# Patient Record
Sex: MALE | Race: BLACK OR AFRICAN AMERICAN | Employment: FULL TIME | URBAN - METROPOLITAN AREA
[De-identification: names, ages, dates, MRNs, and addresses within clinical notes are randomized per-mention and may not be internally consistent; named-entity substitution may affect disease eponyms.]

---

## 2018-05-02 ENCOUNTER — NON-PROVIDER VISIT (OUTPATIENT)
Dept: OCCUPATIONAL MEDICINE | Facility: CLINIC | Age: 38
End: 2018-05-02

## 2018-05-02 DIAGNOSIS — Z02.1 PRE-EMPLOYMENT DRUG SCREENING: ICD-10-CM

## 2018-05-02 PROCEDURE — 80305 DRUG TEST PRSMV DIR OPT OBS: CPT | Performed by: INTERNAL MEDICINE

## 2018-05-03 LAB
AMP AMPHETAMINE: NORMAL
COC COCAINE: NORMAL
INT CON NEG: NORMAL
INT CON POS: NORMAL
MET METHAMPHETAMINES: NORMAL
OPI OPIATES: NORMAL
PCP PHENCYCLIDINE: NORMAL
POC DRUG COMMENT 753798-OCCUPATIONAL HEALTH: NEGATIVE
THC: NORMAL

## 2019-05-02 ENCOUNTER — OFFICE VISIT (OUTPATIENT)
Dept: URGENT CARE | Facility: MEDICAL CENTER | Age: 39
End: 2019-05-02

## 2019-05-02 VITALS
OXYGEN SATURATION: 100 % | TEMPERATURE: 98.6 F | HEART RATE: 91 BPM | RESPIRATION RATE: 16 BRPM | WEIGHT: 144.2 LBS | DIASTOLIC BLOOD PRESSURE: 84 MMHG | SYSTOLIC BLOOD PRESSURE: 122 MMHG

## 2019-05-02 DIAGNOSIS — M54.2 NECK PAIN ON LEFT SIDE: ICD-10-CM

## 2019-05-02 DIAGNOSIS — H52.13 MYOPIA OF BOTH EYES: ICD-10-CM

## 2019-05-02 DIAGNOSIS — M62.838 CERVICAL PARASPINAL MUSCLE SPASM: ICD-10-CM

## 2019-05-02 PROCEDURE — 99204 OFFICE O/P NEW MOD 45 MIN: CPT | Performed by: NURSE PRACTITIONER

## 2019-05-02 RX ORDER — CYCLOBENZAPRINE HCL 10 MG
10 TABLET ORAL 3 TIMES DAILY PRN
Qty: 20 TAB | Refills: 0 | Status: SHIPPED | OUTPATIENT
Start: 2019-05-02 | End: 2021-05-10

## 2019-05-06 ASSESSMENT — ENCOUNTER SYMPTOMS
BLURRED VISION: 1
NECK PAIN: 1

## 2019-05-06 NOTE — PROGRESS NOTES
Subjective:      Navius Saint-Paul is a 39 y.o. female who presents with Other (eye check, trouble seeing up close)            This is a new problem. A Fijian interpretor was used for this visit. Patient presents to the urgent care complaining of not being able to see or read anything far away he states this issue has been going on for several years.  He admits he has not been to an eye doctor for area he also notes pain to the left side of his neck that he has had for about a month.  He states some days the pain is worse than others, he reports it is a stiff feeling in the left side of his neck.  He states when he wakes up in the morning this is been the pain seems to be the most pronounced.  He states the pain from his neck will sometimes radiate to the front part of his neck and slightly to his left shoulder.  He denies any recollection of an injury to his neck he has taken some Aleve without much pain relief.        Review of Systems   Eyes: Positive for blurred vision.   Musculoskeletal: Positive for neck pain.   All other systems reviewed and are negative.    No past medical history on file. No past surgical history on file.   Social History     Social History   • Marital status: Unknown     Spouse name: N/A   • Number of children: N/A   • Years of education: N/A     Occupational History   • Not on file.     Social History Main Topics   • Smoking status: Not on file   • Smokeless tobacco: Not on file   • Alcohol use Not on file   • Drug use: Unknown   • Sexual activity: Not on file     Other Topics Concern   • Not on file     Social History Narrative   • No narrative on file          Objective:     /84   Pulse 91   Temp 37 °C (98.6 °F)   Resp 16   Wt 65.4 kg (144 lb 3.2 oz)   SpO2 100%      Physical Exam   Constitutional: She is oriented to person, place, and time. Vital signs are normal. She appears well-developed and well-nourished.   HENT:   Head: Normocephalic and atraumatic.   Eyes: Pupils are  equal, round, and reactive to light. EOM are normal.   Neck: Normal range of motion. Neck supple. Muscular tenderness present. No spinous process tenderness present. No neck rigidity. No edema, no erythema and normal range of motion present.       Cardiovascular: Normal rate and regular rhythm.    Pulmonary/Chest: Effort normal.   Musculoskeletal: Normal range of motion.   Lymphadenopathy:     She has no cervical adenopathy.   Neurological: She is alert and oriented to person, place, and time.   Skin: Skin is warm and dry. Capillary refill takes less than 2 seconds.   Psychiatric: She has a normal mood and affect. Her speech is normal and behavior is normal. Thought content normal.   Vitals reviewed.              Assessment/Plan:     1. Myopia of both eyes    2. Neck pain on left side    3. Cervical paraspinal muscle spasm  - cyclobenzaprine (FLEXERIL) 10 MG Tab; Take 1 Tab by mouth 3 times a day as needed for Muscle Spasms.  Dispense: 20 Tab; Refill: 0    Discussed with patient he needs to have a proper eye exam and will likely require a prescription  Referred to [facility] as he does not have insurance  Etiology of neck pain is not clear, discussed with patient we will trial muscle relaxers and look for improvement. He agrees  Alternate tylenol and ibuprofen as needed for pain  Red flags discussed for neck pain  Encouraged pt to establish with PCP  Supportive care, differential diagnoses, and indications for immediate follow-up discussed with patient.    Pathogenesis of diagnosis discussed including typical length and natural progression.      Instructed to return to  or nearest emergency department if symptoms fail to improve, for any change in condition, further concerns, or new concerning symptoms.  Patient states understanding of the plan of care and discharge instructions.

## 2019-10-14 ENCOUNTER — APPOINTMENT (OUTPATIENT)
Dept: RADIOLOGY | Facility: MEDICAL CENTER | Age: 39
End: 2019-10-14
Attending: EMERGENCY MEDICINE
Payer: COMMERCIAL

## 2019-10-14 ENCOUNTER — HOSPITAL ENCOUNTER (EMERGENCY)
Facility: MEDICAL CENTER | Age: 39
End: 2019-10-14
Attending: EMERGENCY MEDICINE
Payer: COMMERCIAL

## 2019-10-14 VITALS
RESPIRATION RATE: 16 BRPM | HEART RATE: 75 BPM | BODY MASS INDEX: 19.98 KG/M2 | OXYGEN SATURATION: 100 % | SYSTOLIC BLOOD PRESSURE: 111 MMHG | HEIGHT: 70 IN | TEMPERATURE: 97.2 F | DIASTOLIC BLOOD PRESSURE: 68 MMHG | WEIGHT: 139.55 LBS

## 2019-10-14 DIAGNOSIS — R07.2 PRECORDIAL CHEST PAIN: ICD-10-CM

## 2019-10-14 DIAGNOSIS — R07.89 CHEST WALL PAIN: ICD-10-CM

## 2019-10-14 LAB
ALBUMIN SERPL BCP-MCNC: 4.5 G/DL (ref 3.2–4.9)
ALBUMIN/GLOB SERPL: 1.5 G/DL
ALP SERPL-CCNC: 101 U/L (ref 30–99)
ALT SERPL-CCNC: 18 U/L (ref 2–50)
ANION GAP SERPL CALC-SCNC: 6 MMOL/L (ref 0–11.9)
AST SERPL-CCNC: 20 U/L (ref 12–45)
BASOPHILS # BLD AUTO: 0.5 % (ref 0–1.8)
BASOPHILS # BLD: 0.03 K/UL (ref 0–0.12)
BILIRUB SERPL-MCNC: 0.3 MG/DL (ref 0.1–1.5)
BUN SERPL-MCNC: 17 MG/DL (ref 8–22)
CALCIUM SERPL-MCNC: 9.5 MG/DL (ref 8.5–10.5)
CHLORIDE SERPL-SCNC: 104 MMOL/L (ref 96–112)
CO2 SERPL-SCNC: 27 MMOL/L (ref 20–33)
CREAT SERPL-MCNC: 1.05 MG/DL (ref 0.5–1.4)
EKG IMPRESSION: NORMAL
EOSINOPHIL # BLD AUTO: 0.17 K/UL (ref 0–0.51)
EOSINOPHIL NFR BLD: 3.1 % (ref 0–6.9)
ERYTHROCYTE [DISTWIDTH] IN BLOOD BY AUTOMATED COUNT: 43.9 FL (ref 35.9–50)
GLOBULIN SER CALC-MCNC: 3 G/DL (ref 1.9–3.5)
GLUCOSE SERPL-MCNC: 78 MG/DL (ref 65–99)
HCT VFR BLD AUTO: 49 % (ref 42–52)
HGB BLD-MCNC: 16.1 G/DL (ref 14–18)
IMM GRANULOCYTES # BLD AUTO: 0 K/UL (ref 0–0.11)
IMM GRANULOCYTES NFR BLD AUTO: 0 % (ref 0–0.9)
LYMPHOCYTES # BLD AUTO: 2.59 K/UL (ref 1–4.8)
LYMPHOCYTES NFR BLD: 46.6 % (ref 22–41)
MCH RBC QN AUTO: 29 PG (ref 27–33)
MCHC RBC AUTO-ENTMCNC: 32.9 G/DL (ref 33.7–35.3)
MCV RBC AUTO: 88.3 FL (ref 81.4–97.8)
MONOCYTES # BLD AUTO: 0.54 K/UL (ref 0–0.85)
MONOCYTES NFR BLD AUTO: 9.7 % (ref 0–13.4)
NEUTROPHILS # BLD AUTO: 2.23 K/UL (ref 1.82–7.42)
NEUTROPHILS NFR BLD: 40.1 % (ref 44–72)
NRBC # BLD AUTO: 0 K/UL
NRBC BLD-RTO: 0 /100 WBC
PLATELET # BLD AUTO: 276 K/UL (ref 164–446)
PMV BLD AUTO: 9.6 FL (ref 9–12.9)
POTASSIUM SERPL-SCNC: 4 MMOL/L (ref 3.6–5.5)
PROT SERPL-MCNC: 7.5 G/DL (ref 6–8.2)
RBC # BLD AUTO: 5.55 M/UL (ref 4.7–6.1)
SODIUM SERPL-SCNC: 137 MMOL/L (ref 135–145)
TROPONIN T SERPL-MCNC: <6 NG/L (ref 6–19)
WBC # BLD AUTO: 5.6 K/UL (ref 4.8–10.8)

## 2019-10-14 PROCEDURE — 96374 THER/PROPH/DIAG INJ IV PUSH: CPT

## 2019-10-14 PROCEDURE — 93005 ELECTROCARDIOGRAM TRACING: CPT | Performed by: EMERGENCY MEDICINE

## 2019-10-14 PROCEDURE — 85025 COMPLETE CBC W/AUTO DIFF WBC: CPT

## 2019-10-14 PROCEDURE — 71045 X-RAY EXAM CHEST 1 VIEW: CPT

## 2019-10-14 PROCEDURE — 84484 ASSAY OF TROPONIN QUANT: CPT

## 2019-10-14 PROCEDURE — 700111 HCHG RX REV CODE 636 W/ 250 OVERRIDE (IP): Performed by: EMERGENCY MEDICINE

## 2019-10-14 PROCEDURE — 99285 EMERGENCY DEPT VISIT HI MDM: CPT

## 2019-10-14 PROCEDURE — 80053 COMPREHEN METABOLIC PANEL: CPT

## 2019-10-14 PROCEDURE — 93005 ELECTROCARDIOGRAM TRACING: CPT

## 2019-10-14 PROCEDURE — 36415 COLL VENOUS BLD VENIPUNCTURE: CPT

## 2019-10-14 RX ORDER — KETOROLAC TROMETHAMINE 30 MG/ML
30 INJECTION, SOLUTION INTRAMUSCULAR; INTRAVENOUS ONCE
Status: COMPLETED | OUTPATIENT
Start: 2019-10-14 | End: 2019-10-14

## 2019-10-14 RX ORDER — NAPROXEN 500 MG/1
500 TABLET ORAL 2 TIMES DAILY WITH MEALS
Qty: 20 TAB | Refills: 0 | Status: SHIPPED | OUTPATIENT
Start: 2019-10-14 | End: 2021-05-10

## 2019-10-14 RX ADMIN — KETOROLAC TROMETHAMINE 30 MG: 30 INJECTION, SOLUTION INTRAMUSCULAR at 15:01

## 2019-10-14 SDOH — HEALTH STABILITY: MENTAL HEALTH: HOW OFTEN DO YOU HAVE A DRINK CONTAINING ALCOHOL?: NEVER

## 2019-10-14 NOTE — ED TRIAGE NOTES
"Chief Complaint   Patient presents with   • Chest Pain     substernal chest pain x 3 weeks     ./74   Pulse 83   Temp 36.2 °C (97.2 °F) (Temporal)   Resp 15   Ht 1.778 m (5' 10\")   Wt 63.3 kg (139 lb 8.8 oz)   SpO2 100%   BMI 20.02 kg/m²     Ambulatory to triage with above complaints, states pain increases when he is moving, driving, turning the steering wheel, lifting up his arms, (+) nausea at times, denies current nausea, denies trauma, EKG done, patient educated on triage process, placed in lobby, told to inform staff of any changes in condition.    "

## 2019-10-14 NOTE — ED PROVIDER NOTES
ED Provider  Scribed for Jhoan Lieberman D.O. by Rolf Berry. 10/14/2019  2:23 PM    Means of arrival:walk-in  History obtained from:patient  History limited by: none    CHIEF COMPLAINT  Chief Complaint   Patient presents with   • Chest Pain     substernal chest pain x 3 weeks       HPI  Navius Saint-Paul is a 39 y.o. male who presents to the ED complaining of constant superior sternal chest pain onset 3 weeks ago. The patient reports that his pain is at its worst when he sneezes and is also exacerbated by getting up from bed, while driving, moving his arms, and excessive movement. He has also taken Aleve for symptom relief but denies any alleviation, which prompted his visit to the ED. He endorses associated nausea in the mornings when he works but denies any emesis, shortness or breath, or dyspnea on exertion. The patient denies having any recent illnesses or regular coughs and also denies any history of heart disease, lung disease, smoking history, or previous medical conditions including hypertension, diabetes, or hyperlipidemia. Additionally, he denies any family history of MI occurring under the age of 55.     REVIEW OF SYSTEMS  See HPI for further details. All other systems are negative.     PAST MEDICAL HISTORY  No history of hypertension, diabetes, or hyperlipidemia.    SOCIAL HISTORY  Social History     Tobacco Use   • Smoking status: Never Smoker   • Smokeless tobacco: Never Used   Substance and Sexual Activity   • Alcohol use: Never     Frequency: Never   • Drug use: Never   Patient speaks Romanian-Creole    SURGICAL HISTORY  patient denies any surgical history    CURRENT MEDICATIONS  Home Medications     Reviewed by Thao Liu R.N. (Registered Nurse) on 10/14/19 at 1200  Med List Status: Partial   Medication Last Dose Status   cyclobenzaprine (FLEXERIL) 10 MG Tab  Active   Naproxen Sodium (ALEVE PO)  Active                ALLERGIES  No Known Allergies    PHYSICAL EXAM  VITAL SIGNS: BP  "117/74   Pulse 83   Temp 36.2 °C (97.2 °F) (Temporal)   Resp 15   Ht 1.778 m (5' 10\")   Wt 63.3 kg (139 lb 8.8 oz)   SpO2 100%   BMI 20.02 kg/m²   Constitutional: Alert in no apparent distress.  HENT: No signs of trauma, mucous membranes are moist  Eyes: Conjunctiva normal, Non-icteric.   Neck: Normal range of motion, No tenderness, Supple.  Lymphatic: No lymphadenopathy noted.   Cardiovascular: Regular rate and rhythm, no murmurs.   Thorax & Lungs: Chest tenderness at the superior sternal area. Normal breath sounds, No respiratory distress, No wheezing.   Abdomen: Bowel sounds normal, Soft, No tenderness, No masses, No pulsatile masses. No peritoneal signs.  Skin: Warm, Dry, normal color.   Back: No bony tenderness, No CVA tenderness.   Extremities: No edema, No tenderness, No cyanosis  Musculoskeletal: Good range of motion in all major joints. No tenderness to palpation or major deformities noted.   Neurologic: Alert and oriented x4, Normal motor function, Normal sensory function, No focal deficits noted.   Psychiatric: Affect normal, Judgment normal, Mood normal.     MEDICAL DECISION MAKING  This is a 39 y.o. male who presents with a chest pain that is worse with the use of the musculoskeletal system of the chest.  He has no other associated symptoms to be concerning for heart disease he has had the pain essentially for 3 weeks.  His EKG is normal with no ischemic signs.  He has no cardiac risk factors and troponin is negative.  His pain is resolved with Toradol I believe this to be musculoskeletal chest pain.  He is discharged home with anti-inflammatory pain medication.    DIAGNOSTIC STUDIES / PROCEDURES    EKG  12 Lead EKG interpreted by me shown below.    LABS  Results for orders placed or performed during the hospital encounter of 10/14/19   CBC with Differential   Result Value Ref Range    WBC 5.6 4.8 - 10.8 K/uL    RBC 5.55 4.70 - 6.10 M/uL    Hemoglobin 16.1 14.0 - 18.0 g/dL    Hematocrit 49.0 42.0 " - 52.0 %    MCV 88.3 81.4 - 97.8 fL    MCH 29.0 27.0 - 33.0 pg    MCHC 32.9 (L) 33.7 - 35.3 g/dL    RDW 43.9 35.9 - 50.0 fL    Platelet Count 276 164 - 446 K/uL    MPV 9.6 9.0 - 12.9 fL    Neutrophils-Polys 40.10 (L) 44.00 - 72.00 %    Lymphocytes 46.60 (H) 22.00 - 41.00 %    Monocytes 9.70 0.00 - 13.40 %    Eosinophils 3.10 0.00 - 6.90 %    Basophils 0.50 0.00 - 1.80 %    Immature Granulocytes 0.00 0.00 - 0.90 %    Nucleated RBC 0.00 /100 WBC    Neutrophils (Absolute) 2.23 1.82 - 7.42 K/uL    Lymphs (Absolute) 2.59 1.00 - 4.80 K/uL    Monos (Absolute) 0.54 0.00 - 0.85 K/uL    Eos (Absolute) 0.17 0.00 - 0.51 K/uL    Baso (Absolute) 0.03 0.00 - 0.12 K/uL    Immature Granulocytes (abs) 0.00 0.00 - 0.11 K/uL    NRBC (Absolute) 0.00 K/uL   Complete Metabolic Panel (CMP)   Result Value Ref Range    Sodium 137 135 - 145 mmol/L    Potassium 4.0 3.6 - 5.5 mmol/L    Chloride 104 96 - 112 mmol/L    Co2 27 20 - 33 mmol/L    Anion Gap 6.0 0.0 - 11.9    Glucose 78 65 - 99 mg/dL    Bun 17 8 - 22 mg/dL    Creatinine 1.05 0.50 - 1.40 mg/dL    Calcium 9.5 8.5 - 10.5 mg/dL    AST(SGOT) 20 12 - 45 U/L    ALT(SGPT) 18 2 - 50 U/L    Alkaline Phosphatase 101 (H) 30 - 99 U/L    Total Bilirubin 0.3 0.1 - 1.5 mg/dL    Albumin 4.5 3.2 - 4.9 g/dL    Total Protein 7.5 6.0 - 8.2 g/dL    Globulin 3.0 1.9 - 3.5 g/dL    A-G Ratio 1.5 g/dL   Troponin   Result Value Ref Range    Troponin T <6 6 - 19 ng/L   ESTIMATED GFR   Result Value Ref Range    GFR If African American >60 >60 mL/min/1.73 m 2    GFR If Non African American >60 >60 mL/min/1.73 m 2   EKG (NOW)   Result Value Ref Range    Report       [facility] Emergency Dept.    Test Date:  2019-10-14  Pt Name:    NAVIUS SAINT-PAUL            Department: ER  MRN:        [id number]                      Room:  Gender:     Male                         Technician: [id number]  :        1980                   Requested By:ER TRIAGE PROTOCOL  Order #:    [id number]                     Reading MD: CATRINA HICKS D.O.    Measurements  Intervals                                Axis  Rate:       79                           P:          70  WI:         148                          QRS:        66  QRSD:       80                           T:          63  QT:         344  QTc:        395    Interpretive Statements  SINUS RHYTHM  No previous ECG available for comparison    Electronically Signed On 10- 16:29:08 PDT by CATRINA HICKS D.O.       All labs reviewed by me.    RADIOLOGY  DX-CHEST-PORTABLE (1 VIEW)   Final Result      Negative single view of the chest.        The radiologist's interpretations of all radiological studies have been reviewed by me.        COURSE  Pertinent Labs & Imaging studies reviewed. (See chart for details)    2:23 PM - Patient seen and examined at bedside. Discussed plan of care. The patient will be medicated with Toradol 30 mg. Ordered for DX-Chest, CBC Diff, CMP, Troponin, and EKG to evaluate his symptoms.     4:12 PM - Patient was reevaluated at bedside and reports that his chest pain has resolved with Toradol. Using an  service, I discussed lab and radiology results with the patient and informed them that he may be safely discharged home at this time. The patient is agreeable and understanding to the plan for discharged and will return for any new or worsening symptoms.    The patient will return for new or worsening symptoms and is stable at the time of discharge.    The patient is referred to a primary physician for blood pressure management, diabetic screening, and for all other preventative health concerns.    DISPOSITION:  Patient will be discharged home in stable condition.    FOLLOW UP:  [facility] scheduling  Please call [phone] to make an appoint with a next available practitioner.          OUTPATIENT MEDICATIONS:  New Prescriptions    NAPROXEN (NAPROSYN) 500 MG TAB    Take 1 Tab by mouth 2 times a day, with meals.       FINAL  IMPRESSION  1. Precordial chest pain    2. Chest wall pain         Rolf FRANKLIN (Scribe), am scribing for, and in the presence of, Jhoan Lieberman D.O..    Electronically signed by: Rolf Berry (Scribe), 10/14/2019    IJhoan D.O. personally performed the services described in this documentation, as scribed by Rolf Berry in my presence, and it is both accurate and complete.    C     The note accurately reflects work and decisions made by me.  Jhoan Lieberman  10/14/2019  4:39 PM

## 2019-10-14 NOTE — DISCHARGE INSTRUCTIONS
Use pain medication as prescribed.  Your pain does not appear to be coming from the heart or lungs and sounds more like is related to the muscles of the chest itself.  Please follow-up with the primary care physician

## 2019-10-15 NOTE — ED NOTES
Pt discharge to home.  Pt provided with discharge instructions and prescriptions.  Pt verbalized understanding, all questions answered.  VSS upon DC. Pt steady on feet upon DC.

## 2021-05-10 ENCOUNTER — OFFICE VISIT (OUTPATIENT)
Dept: URGENT CARE | Facility: CLINIC | Age: 41
End: 2021-05-10
Payer: COMMERCIAL

## 2021-05-10 VITALS
SYSTOLIC BLOOD PRESSURE: 112 MMHG | OXYGEN SATURATION: 99 % | WEIGHT: 151 LBS | HEIGHT: 69 IN | HEART RATE: 82 BPM | DIASTOLIC BLOOD PRESSURE: 60 MMHG | BODY MASS INDEX: 22.36 KG/M2 | TEMPERATURE: 99.3 F | RESPIRATION RATE: 20 BRPM

## 2021-05-10 DIAGNOSIS — M79.18 PIRIFORMIS MUSCLE PAIN: ICD-10-CM

## 2021-05-10 PROCEDURE — 99213 OFFICE O/P EST LOW 20 MIN: CPT | Performed by: PHYSICIAN ASSISTANT

## 2021-05-10 ASSESSMENT — ENCOUNTER SYMPTOMS
SHORTNESS OF BREATH: 0
CONSTIPATION: 0
CHILLS: 0
EYE PAIN: 0
ABDOMINAL PAIN: 0
VOMITING: 0
NAUSEA: 0
DIARRHEA: 0
SORE THROAT: 0
COUGH: 0
MYALGIAS: 0
FEVER: 0
HEADACHES: 0

## 2021-05-10 ASSESSMENT — PAIN SCALES - GENERAL: PAINLEVEL: 6=MODERATE PAIN

## 2021-05-10 ASSESSMENT — FIBROSIS 4 INDEX: FIB4 SCORE: 0.73

## 2021-05-10 NOTE — PROGRESS NOTES
"Subjective:   Navius Saint-Paul is a 41 y.o. male who presents for Other (Rt buttocks pain, painful as he walks, bends,  or lays down x 1 year)      HPI:  41 years old male presents complains of right sided gluteus pain x 1 year.  Notes large wallet on left side. No injury. No radiation of pain.     Review of Systems   Constitutional: Negative for chills, fever and malaise/fatigue.   HENT: Negative for congestion, ear pain and sore throat.    Eyes: Negative for pain.   Respiratory: Negative for cough and shortness of breath.    Cardiovascular: Negative for chest pain.   Gastrointestinal: Negative for abdominal pain, constipation, diarrhea, nausea and vomiting.   Genitourinary: Negative for dysuria.   Musculoskeletal: Negative for myalgias.   Skin: Negative for rash.   Neurological: Negative for headaches.       Medications:    • ALEVE PO    Allergies: Patient has no known allergies.    Problem List: Navius Saint-Paul does not have a problem list on file.    Surgical History:  No past surgical history on file.    Past Social Hx: Navius Saint-Paul  reports that he has never smoked. He has never used smokeless tobacco. He reports previous alcohol use. He reports that he does not use drugs.     Past Family Hx:  Navius Saint-Paul family history is not on file.     Problem list, medications, and allergies reviewed by myself today in Epic.     Objective:     /60 (BP Location: Left arm, Patient Position: Sitting, BP Cuff Size: Adult)   Pulse 82   Temp 37.4 °C (99.3 °F) (Temporal)   Resp 20   Ht 1.753 m (5' 9\")   Wt 68.5 kg (151 lb)   SpO2 99%   BMI 22.30 kg/m²     Physical Exam  Vitals reviewed.   Constitutional:       Appearance: Normal appearance.   HENT:      Head: Normocephalic and atraumatic.      Right Ear: External ear normal.      Left Ear: External ear normal.      Nose: Nose normal.      Mouth/Throat:      Mouth: Mucous membranes are moist.   Eyes:      Conjunctiva/sclera: Conjunctivae normal. "   Cardiovascular:      Rate and Rhythm: Normal rate.   Pulmonary:      Effort: Pulmonary effort is normal.   Musculoskeletal:      Comments: tenderness to palpation along piriformis with muscle spasm right glute   Skin:     General: Skin is warm and dry.      Capillary Refill: Capillary refill takes less than 2 seconds.   Neurological:      Mental Status: He is alert and oriented to person, place, and time.         Assessment/Plan:     Diagnosis and associated orders:     1. Piriformis muscle pain        Comments/MDM:     • Stretching  • nsaids  • Do not sit on large wallet  •  line unable to assist with jac page MA assisting admirably         Differential diagnosis, natural history, supportive care, and indications for immediate follow-up discussed.    Advised the patient to follow-up with the primary care physician for recheck, reevaluation, and consideration of further management.    Please note that this dictation was created using voice recognition software. I have made a reasonable attempt to correct obvious errors, but I expect that there are errors of grammar and possibly content that I did not discover before finalizing the note.    This note was electronically signed by Josesito James PA-C

## 2022-06-28 ENCOUNTER — OFFICE VISIT (OUTPATIENT)
Dept: MEDICAL GROUP | Facility: MEDICAL CENTER | Age: 42
End: 2022-06-28
Payer: COMMERCIAL

## 2022-06-28 DIAGNOSIS — Z91.199 NO-SHOW FOR APPOINTMENT: ICD-10-CM

## 2022-06-29 PROBLEM — Z91.199 NO-SHOW FOR APPOINTMENT: Status: ACTIVE | Noted: 2022-06-29

## 2022-07-01 ENCOUNTER — OFFICE VISIT (OUTPATIENT)
Dept: MEDICAL GROUP | Facility: MEDICAL CENTER | Age: 42
End: 2022-07-01
Payer: COMMERCIAL

## 2022-07-01 VITALS
HEART RATE: 68 BPM | HEIGHT: 70 IN | WEIGHT: 150 LBS | SYSTOLIC BLOOD PRESSURE: 122 MMHG | BODY MASS INDEX: 21.47 KG/M2 | TEMPERATURE: 97 F | OXYGEN SATURATION: 98 % | DIASTOLIC BLOOD PRESSURE: 68 MMHG

## 2022-07-01 DIAGNOSIS — Z00.00 HEALTH MAINTENANCE EXAMINATION: ICD-10-CM

## 2022-07-01 DIAGNOSIS — M25.562 CHRONIC PAIN OF LEFT KNEE: ICD-10-CM

## 2022-07-01 DIAGNOSIS — G89.29 CHRONIC PAIN OF LEFT KNEE: ICD-10-CM

## 2022-07-01 PROBLEM — Z91.199 NO-SHOW FOR APPOINTMENT: Status: RESOLVED | Noted: 2022-06-29 | Resolved: 2022-07-01

## 2022-07-01 PROCEDURE — 99214 OFFICE O/P EST MOD 30 MIN: CPT | Performed by: STUDENT IN AN ORGANIZED HEALTH CARE EDUCATION/TRAINING PROGRAM

## 2022-07-01 RX ORDER — MELOXICAM 7.5 MG/1
7.5 TABLET ORAL DAILY
Qty: 60 TABLET | Refills: 0 | Status: SHIPPED | OUTPATIENT
Start: 2022-07-01 | End: 2022-10-31 | Stop reason: SDUPTHER

## 2022-07-01 ASSESSMENT — ENCOUNTER SYMPTOMS
SHORTNESS OF BREATH: 0
CHILLS: 0
FEVER: 0

## 2022-07-01 ASSESSMENT — PATIENT HEALTH QUESTIONNAIRE - PHQ9: CLINICAL INTERPRETATION OF PHQ2 SCORE: 0

## 2022-07-01 NOTE — PROGRESS NOTES
"Subjective:    services were used.    CC:  Diagnoses of Chronic pain of left knee and Health maintenance examination were pertinent to this visit.    HISTORY OF THE PRESENT ILLNESS: Patient is a 42 y.o. male with the following medical problems History reviewed. No pertinent past medical history.  . This pleasant patient is here today to establish care and discuss the following;    Problem          Current Outpatient Medications Ordered in Epic   Medication Sig Dispense Refill   • meloxicam (MOBIC) 7.5 MG Tab Take 1 Tablet by mouth every day. 60 Tablet 0     No current Epic-ordered facility-administered medications on file.         ROS:   Review of Systems   Constitutional: Negative for chills and fever.   Respiratory: Negative for shortness of breath.    Cardiovascular: Negative for chest pain.   Musculoskeletal: Positive for joint pain.         Objective:     Exam: /68 (BP Location: Left arm, Patient Position: Sitting, BP Cuff Size: Adult)   Pulse 68   Temp 36.1 °C (97 °F) (Temporal)   Ht 1.765 m (5' 9.5\")   Wt 68 kg (150 lb)   SpO2 98%  Body mass index is 21.83 kg/m².    Physical Exam  Constitutional:       General: He is not in acute distress.     Appearance: He is not ill-appearing.   Pulmonary:      Effort: Pulmonary effort is normal.   Musculoskeletal:      Comments: Left knee: kori test negative, anterior drawer test positive for laxity and pain, posterior drawer test negative. Valgus and varus testing negative    Neurological:      Mental Status: He is alert.   Psychiatric:         Mood and Affect: Mood normal.         Behavior: Behavior normal.         Thought Content: Thought content normal.         Judgment: Judgment normal.               Assessment & Plan:     Problem List Items Addressed This Visit     Chronic pain of left knee     Chronic-worsening  -patient has instability with anterior drawer test   -due to instability and worsening of his symptoms I will order an MRI " of his knee  -will prescribe mobic and Voltaren cream             Relevant Medications    meloxicam (MOBIC) 7.5 MG Tab    Other Relevant Orders    MR-KNEE-W/O LEFT      Other Visit Diagnoses     Health maintenance examination        Relevant Orders    HEMOGLOBIN A1C    CBC WITH DIFFERENTIAL    Comp Metabolic Panel    TSH WITH REFLEX TO FT4    Lipid Profile            Return in about 1 month (around 8/1/2022) for F/U Labs and imaging .    Please note that this dictation was created using voice recognition software. I have made every reasonable attempt to correct obvious errors, but I expect that there are errors of grammar and possibly content that I did not discover before finalizing the note.

## 2022-07-01 NOTE — ASSESSMENT & PLAN NOTE
Chronic-worsening  -patient has instability with anterior drawer test   -due to instability and worsening of his symptoms I will order an MRI of his knee  -will prescribe mobic and Voltaren cream

## 2022-07-02 ENCOUNTER — APPOINTMENT (OUTPATIENT)
Dept: LAB | Facility: MEDICAL CENTER | Age: 42
End: 2022-07-02
Payer: COMMERCIAL

## 2022-07-06 ENCOUNTER — OFFICE VISIT (OUTPATIENT)
Dept: MEDICAL GROUP | Facility: MEDICAL CENTER | Age: 42
End: 2022-07-06
Payer: COMMERCIAL

## 2022-07-06 VITALS
SYSTOLIC BLOOD PRESSURE: 100 MMHG | HEART RATE: 90 BPM | OXYGEN SATURATION: 97 % | DIASTOLIC BLOOD PRESSURE: 62 MMHG | HEIGHT: 70 IN | WEIGHT: 146 LBS | TEMPERATURE: 97.3 F | BODY MASS INDEX: 20.9 KG/M2

## 2022-07-06 DIAGNOSIS — M79.18 RIGHT BUTTOCK PAIN: ICD-10-CM

## 2022-07-06 DIAGNOSIS — M25.562 ACUTE PAIN OF LEFT KNEE: ICD-10-CM

## 2022-07-06 LAB
ALBUMIN SERPL-MCNC: 4.3 G/DL (ref 4–5)
ALBUMIN/GLOB SERPL: 1.6 {RATIO} (ref 1.2–2.2)
ALP SERPL-CCNC: 127 IU/L (ref 44–121)
ALT SERPL-CCNC: 37 IU/L (ref 0–44)
AST SERPL-CCNC: 26 IU/L (ref 0–40)
BASOPHILS # BLD AUTO: 0 X10E3/UL (ref 0–0.2)
BASOPHILS NFR BLD AUTO: 0 %
BILIRUB SERPL-MCNC: <0.2 MG/DL (ref 0–1.2)
BUN SERPL-MCNC: 9 MG/DL (ref 6–24)
BUN/CREAT SERPL: 9 (ref 9–20)
CALCIUM SERPL-MCNC: 9.3 MG/DL (ref 8.7–10.2)
CHLORIDE SERPL-SCNC: 103 MMOL/L (ref 96–106)
CHOLEST SERPL-MCNC: 196 MG/DL (ref 100–199)
CO2 SERPL-SCNC: 24 MMOL/L (ref 20–29)
CREAT SERPL-MCNC: 1.03 MG/DL (ref 0.76–1.27)
EGFRCR SERPLBLD CKD-EPI 2021: 92 ML/MIN/1.73
EOSINOPHIL # BLD AUTO: 0.2 X10E3/UL (ref 0–0.4)
EOSINOPHIL NFR BLD AUTO: 5 %
ERYTHROCYTE [DISTWIDTH] IN BLOOD BY AUTOMATED COUNT: 13.6 % (ref 11.6–15.4)
GLOBULIN SER CALC-MCNC: 2.7 G/DL (ref 1.5–4.5)
GLUCOSE SERPL-MCNC: 98 MG/DL (ref 65–99)
HBA1C MFR BLD: 5.9 % (ref 4.8–5.6)
HCT VFR BLD AUTO: 48.6 % (ref 37.5–51)
HDLC SERPL-MCNC: 53 MG/DL
HGB BLD-MCNC: 15.9 G/DL (ref 13–17.7)
IMM GRANULOCYTES # BLD AUTO: 0 X10E3/UL (ref 0–0.1)
IMM GRANULOCYTES NFR BLD AUTO: 0 %
IMMATURE CELLS  115398: NORMAL
LABORATORY COMMENT REPORT: ABNORMAL
LDLC SERPL CALC-MCNC: 131 MG/DL (ref 0–99)
LYMPHOCYTES # BLD AUTO: 2.5 X10E3/UL (ref 0.7–3.1)
LYMPHOCYTES NFR BLD AUTO: 52 %
MCH RBC QN AUTO: 29.1 PG (ref 26.6–33)
MCHC RBC AUTO-ENTMCNC: 32.7 G/DL (ref 31.5–35.7)
MCV RBC AUTO: 89 FL (ref 79–97)
MONOCYTES # BLD AUTO: 0.4 X10E3/UL (ref 0.1–0.9)
MONOCYTES NFR BLD AUTO: 9 %
MORPHOLOGY BLD-IMP: NORMAL
NEUTROPHILS # BLD AUTO: 1.7 X10E3/UL (ref 1.4–7)
NEUTROPHILS NFR BLD AUTO: 34 %
NRBC BLD AUTO-RTO: NORMAL %
PLATELET # BLD AUTO: 277 X10E3/UL (ref 150–450)
POTASSIUM SERPL-SCNC: 4.7 MMOL/L (ref 3.5–5.2)
PROT SERPL-MCNC: 7 G/DL (ref 6–8.5)
RBC # BLD AUTO: 5.46 X10E6/UL (ref 4.14–5.8)
SODIUM SERPL-SCNC: 140 MMOL/L (ref 134–144)
TRIGL SERPL-MCNC: 64 MG/DL (ref 0–149)
TSH SERPL DL<=0.005 MIU/L-ACNC: 3.28 UIU/ML (ref 0.45–4.5)
VLDLC SERPL CALC-MCNC: 12 MG/DL (ref 5–40)
WBC # BLD AUTO: 4.8 X10E3/UL (ref 3.4–10.8)

## 2022-07-06 PROCEDURE — 99214 OFFICE O/P EST MOD 30 MIN: CPT | Performed by: INTERNAL MEDICINE

## 2022-07-06 NOTE — PROGRESS NOTES
"    Established Patient    Georges presents today with the following:    CC:  Worsening left knee pain, right butt burning pain    HPI:   Georges is a 42 y.o. male who came in for above.    He has chronic left knee pain for 6 months without any initial injury.  Recently, this has been worsening to the point that it is difficult to step up and down the stairs.  He can bear weight, stand and walk okay. He cannot put pressure on left knee while using stairs.  He has occasional swelling behind the knee.     He try meloxicam prescribed by his PCP without any effect. MRI was ordered but not done yet.    He had a lump in right buttock. He kept pressuring and massaging the area which made the lump gone.  Since then he has been having burning sensation in buttock.        ROS:   As above    Patient Active Problem List    Diagnosis Date Noted   • Chronic pain of left knee 07/01/2022       Current Outpatient Medications   Medication Sig Dispense Refill   • meloxicam (MOBIC) 7.5 MG Tab Take 1 Tablet by mouth every day. 60 Tablet 0     No current facility-administered medications for this visit.         /62 (BP Location: Left arm, Patient Position: Sitting, BP Cuff Size: Adult)   Pulse 90   Temp 36.3 °C (97.3 °F) (Temporal)   Ht 1.765 m (5' 9.5\")   Wt 66.2 kg (146 lb)   SpO2 97%   BMI 21.25 kg/m²     Physical Exam  General: Alert and oriented, No apparent distress.   E       Assessment and Plan    1. Acute on chronic pain of left knee  He has to take stairs at work sometimes.  He would like to recover as soon as possible since it is limiting his activity and worsening.  Exam showed no tenderness in the joint line or effusion. Laxity of lateral collateral ligament on left knee and pain with pressure on the lateral meniscus.  Suspecting ligamental or meniscus tear.   - Referral to Orthopedics    2. Right buttock pain  - ?  Sciatic nerve irritation without radicular symptoms or scar tissue involving cutaneous nerve causing " burning sensation.  -Reassured.  Recommended Biofreeze topically as needed.      Return if symptoms worsen or fail to improve.         Signed by: Iwona Laughlin M.D.

## 2022-07-13 ENCOUNTER — TELEPHONE (OUTPATIENT)
Dept: MEDICAL GROUP | Facility: MEDICAL CENTER | Age: 42
End: 2022-07-13
Payer: COMMERCIAL

## 2022-07-13 NOTE — TELEPHONE ENCOUNTER
----- Message from Adelso Doss D.O. sent at 7/6/2022  3:40 PM PDT -----  Can you please let the patient know that his prediabete labs were elevated along with his cholestrol. We will discuss his labs in detail at the next visit.

## 2022-07-19 ENCOUNTER — HOSPITAL ENCOUNTER (OUTPATIENT)
Dept: LAB | Facility: MEDICAL CENTER | Age: 42
End: 2022-07-19
Attending: STUDENT IN AN ORGANIZED HEALTH CARE EDUCATION/TRAINING PROGRAM
Payer: COMMERCIAL

## 2022-07-19 ENCOUNTER — OFFICE VISIT (OUTPATIENT)
Dept: MEDICAL GROUP | Facility: MEDICAL CENTER | Age: 42
End: 2022-07-19
Payer: COMMERCIAL

## 2022-07-19 VITALS
BODY MASS INDEX: 20.9 KG/M2 | OXYGEN SATURATION: 100 % | DIASTOLIC BLOOD PRESSURE: 62 MMHG | TEMPERATURE: 97 F | HEIGHT: 70 IN | HEART RATE: 74 BPM | SYSTOLIC BLOOD PRESSURE: 124 MMHG | WEIGHT: 146 LBS

## 2022-07-19 DIAGNOSIS — Z23 NEED FOR VACCINATION: ICD-10-CM

## 2022-07-19 DIAGNOSIS — K21.9 GASTROESOPHAGEAL REFLUX DISEASE WITHOUT ESOPHAGITIS: ICD-10-CM

## 2022-07-19 DIAGNOSIS — R73.03 PREDIABETES: ICD-10-CM

## 2022-07-19 DIAGNOSIS — E78.5 DYSLIPIDEMIA: ICD-10-CM

## 2022-07-19 DIAGNOSIS — R74.8 ABNORMAL SERUM LEVEL OF ALKALINE PHOSPHATASE: ICD-10-CM

## 2022-07-19 PROCEDURE — 86704 HEP B CORE ANTIBODY TOTAL: CPT

## 2022-07-19 PROCEDURE — 80074 ACUTE HEPATITIS PANEL: CPT

## 2022-07-19 PROCEDURE — 90715 TDAP VACCINE 7 YRS/> IM: CPT | Performed by: STUDENT IN AN ORGANIZED HEALTH CARE EDUCATION/TRAINING PROGRAM

## 2022-07-19 PROCEDURE — 82977 ASSAY OF GGT: CPT

## 2022-07-19 PROCEDURE — 36415 COLL VENOUS BLD VENIPUNCTURE: CPT

## 2022-07-19 PROCEDURE — 99214 OFFICE O/P EST MOD 30 MIN: CPT | Mod: 25 | Performed by: STUDENT IN AN ORGANIZED HEALTH CARE EDUCATION/TRAINING PROGRAM

## 2022-07-19 PROCEDURE — 90471 IMMUNIZATION ADMIN: CPT | Performed by: STUDENT IN AN ORGANIZED HEALTH CARE EDUCATION/TRAINING PROGRAM

## 2022-07-19 ASSESSMENT — ENCOUNTER SYMPTOMS
FEVER: 0
CHILLS: 0
SHORTNESS OF BREATH: 0

## 2022-07-19 ASSESSMENT — FIBROSIS 4 INDEX: FIB4 SCORE: 0.68

## 2022-07-19 NOTE — PROGRESS NOTES
"Subjective:   Serbian Creole  services used     CC: Lab Results     HPI:   Georges presents today for the following;    Problem   Prediabetes    -A1c 5.9, fasting BG is wnl  -he does not exercise  -he eats a lot of bread and rice      Dyslipidemia      Lab Results   Component Value Date/Time    CHOLSTRLTOT 196 07/05/2022 05:26 AM    HDL 53 07/05/2022 05:26 AM    TRIGLYCERIDE 64 07/05/2022 05:26 AM            Gastroesophageal Reflux Disease Without Esophagitis    Patient has burning sensation when laying down. No difficulty swallowing     Abnormal Serum Level of Alkaline Phosphatase    -AST/ALT wnl  -GGT has increased from 2 years ago  -he denies drinking alcohol          Current Outpatient Medications Ordered in Epic   Medication Sig Dispense Refill   • meloxicam (MOBIC) 7.5 MG Tab Take 1 Tablet by mouth every day. 60 Tablet 0     No current Epic-ordered facility-administered medications on file.           ROS:  Review of Systems   Constitutional: Negative for chills and fever.   Respiratory: Negative for shortness of breath.    Cardiovascular: Negative for chest pain.       Objective:     Exam:  /62 (BP Location: Left arm, Patient Position: Sitting, BP Cuff Size: Adult)   Pulse 74   Temp 36.1 °C (97 °F) (Temporal)   Ht 1.765 m (5' 9.5\")   Wt 66.2 kg (146 lb)   SpO2 100%   BMI 21.25 kg/m²  Body mass index is 21.25 kg/m².    Physical Exam  Constitutional:       General: He is not in acute distress.     Appearance: He is not ill-appearing.   Cardiovascular:      Rate and Rhythm: Normal rate and regular rhythm.      Pulses: Normal pulses.   Pulmonary:      Effort: Pulmonary effort is normal. No respiratory distress.      Breath sounds: No stridor. No wheezing, rhonchi or rales.   Neurological:      Mental Status: He is alert.   Psychiatric:         Mood and Affect: Mood normal.         Behavior: Behavior normal.         Thought Content: Thought content normal.         Judgment: Judgment " normal.               Assessment & Plan:     Problem List Items Addressed This Visit     Abnormal serum level of alkaline phosphatase     -start with GGT and hepatitis b and C testing            Relevant Orders    HEP C VIRUS ANTIBODY    GAMMA GT (GGT)    HEP B CORE AB TOTAL    HEPATITIS PANEL ACUTE(4 COMPONENTS)    Dyslipidemia     Chronic  -lifestyle modifications discussed              Gastroesophageal reflux disease without esophagitis     Chronic  -recommended to take tums            Prediabetes     Acute new problem  -life style modifications with exercise and diet discussed                        No follow-ups on file.    Please note that this dictation was created using voice recognition software. I have made every reasonable attempt to correct obvious errors, but I expect that there are errors of grammar and possibly content that I did not discover before finalizing the note.

## 2022-07-20 LAB
GGT SERPL-CCNC: 46 U/L (ref 7–51)
HAV IGM SERPL QL IA: NORMAL
HBV CORE AB SERPL QL IA: NONREACTIVE
HBV CORE IGM SER QL: NORMAL
HBV SURFACE AG SER QL: NORMAL
HCV AB SER QL: NORMAL

## 2022-10-31 ENCOUNTER — OFFICE VISIT (OUTPATIENT)
Dept: MEDICAL GROUP | Facility: MEDICAL CENTER | Age: 42
End: 2022-10-31
Payer: COMMERCIAL

## 2022-10-31 VITALS
BODY MASS INDEX: 23.89 KG/M2 | WEIGHT: 152.2 LBS | HEIGHT: 67 IN | HEART RATE: 81 BPM | TEMPERATURE: 97.9 F | SYSTOLIC BLOOD PRESSURE: 110 MMHG | OXYGEN SATURATION: 98 % | DIASTOLIC BLOOD PRESSURE: 74 MMHG

## 2022-10-31 DIAGNOSIS — M25.562 CHRONIC PAIN OF LEFT KNEE: ICD-10-CM

## 2022-10-31 DIAGNOSIS — R22.2 BUTTOCKS NODULE: ICD-10-CM

## 2022-10-31 DIAGNOSIS — G89.29 CHRONIC PAIN OF LEFT KNEE: ICD-10-CM

## 2022-10-31 PROCEDURE — 99214 OFFICE O/P EST MOD 30 MIN: CPT | Performed by: STUDENT IN AN ORGANIZED HEALTH CARE EDUCATION/TRAINING PROGRAM

## 2022-10-31 RX ORDER — MELOXICAM 7.5 MG/1
7.5 TABLET ORAL DAILY
Qty: 60 TABLET | Refills: 0 | Status: SHIPPED | OUTPATIENT
Start: 2022-10-31

## 2022-10-31 ASSESSMENT — ENCOUNTER SYMPTOMS
SHORTNESS OF BREATH: 0
CHILLS: 0
FEVER: 0

## 2022-10-31 ASSESSMENT — FIBROSIS 4 INDEX: FIB4 SCORE: 0.68

## 2022-10-31 NOTE — ASSESSMENT & PLAN NOTE
Chronic-worsening  -US will be needed to further evaluate etiology of his discomfort. ddx includes cyst vs abscess (unlikely)  -discussed with patient to use meloxicam if needed

## 2022-10-31 NOTE — PROGRESS NOTES
"Subjective:    services used     CC: skin issue     HPI:   Georges presents today for the following;    Problem   Buttocks Nodule    Patient feels a \"bump\" on his right buttocks. It hurts to sit on it. He does not affect him while walking. He notices it mostly when sitting or laying on it. He first noticed the bump in 2017 and it looked a like pimple, but now he no longer has skin changes but still feels the pain. No fevers or specific trauma to the area. No rectal pain         Current Outpatient Medications Ordered in Epic   Medication Sig Dispense Refill    meloxicam (MOBIC) 7.5 MG Tab Take 1 Tablet by mouth every day. 60 Tablet 0     No current Epic-ordered facility-administered medications on file.           ROS:  Review of Systems   Constitutional:  Negative for chills and fever.   Respiratory:  Negative for shortness of breath.    Cardiovascular:  Negative for chest pain.   Skin:  Negative for itching and rash.     Objective:     Exam:  /74 (BP Location: Left arm, Patient Position: Sitting, BP Cuff Size: Adult)   Pulse 81   Temp 36.6 °C (97.9 °F) (Temporal)   Ht 1.7 m (5' 6.93\")   Wt 69 kg (152 lb 3.2 oz)   SpO2 98%   BMI 23.89 kg/m²  Body mass index is 23.89 kg/m².    Physical Exam  Constitutional:       General: He is not in acute distress.     Appearance: He is not ill-appearing.   Pulmonary:      Effort: Pulmonary effort is normal.   Skin:     Comments: Right buttocks possible nodule noticed. Difficult to fully appreciate    Neurological:      Mental Status: He is alert.   Psychiatric:         Mood and Affect: Mood normal.         Behavior: Behavior normal.         Thought Content: Thought content normal.         Judgment: Judgment normal.             Assessment & Plan:     Problem List Items Addressed This Visit       Buttocks nodule     Chronic-worsening  -US will be needed to further evaluate etiology of his discomfort. ddx includes cyst vs abscess (unlikely)  -discussed with " patient to use meloxicam if needed                           Please note that this dictation was created using voice recognition software. I have made every reasonable attempt to correct obvious errors, but I expect that there are errors of grammar and possibly content that I did not discover before finalizing the note.

## 2025-04-11 ENCOUNTER — OCCUPATIONAL MEDICINE (OUTPATIENT)
Dept: URGENT CARE | Facility: CLINIC | Age: 45
End: 2025-04-11
Payer: COMMERCIAL

## 2025-04-11 VITALS
HEIGHT: 70 IN | WEIGHT: 152 LBS | BODY MASS INDEX: 21.76 KG/M2 | HEART RATE: 75 BPM | RESPIRATION RATE: 16 BRPM | DIASTOLIC BLOOD PRESSURE: 76 MMHG | OXYGEN SATURATION: 100 % | SYSTOLIC BLOOD PRESSURE: 122 MMHG | TEMPERATURE: 98.8 F

## 2025-04-11 DIAGNOSIS — S61.431A PUNCTURE WOUND OF RIGHT HAND WITHOUT FOREIGN BODY, INITIAL ENCOUNTER: ICD-10-CM

## 2025-04-11 PROCEDURE — 3078F DIAST BP <80 MM HG: CPT | Performed by: STUDENT IN AN ORGANIZED HEALTH CARE EDUCATION/TRAINING PROGRAM

## 2025-04-11 PROCEDURE — 99214 OFFICE O/P EST MOD 30 MIN: CPT | Performed by: STUDENT IN AN ORGANIZED HEALTH CARE EDUCATION/TRAINING PROGRAM

## 2025-04-11 PROCEDURE — 3074F SYST BP LT 130 MM HG: CPT | Performed by: STUDENT IN AN ORGANIZED HEALTH CARE EDUCATION/TRAINING PROGRAM

## 2025-04-11 RX ORDER — IBUPROFEN 600 MG/1
600 TABLET, FILM COATED ORAL EVERY 6 HOURS PRN
Qty: 30 TABLET | Refills: 0 | Status: SHIPPED | OUTPATIENT
Start: 2025-04-11

## 2025-04-11 NOTE — LETTER
PHYSICIAN’S AND CHIROPRACTIC PHYSICIAN'S   PROGRESS REPORT   CERTIFICATION OF DISABILITY Claim Number:     Social Security Number:    Patient’s Name: Navius Saint Paul Date of Injury: 4/11/2025   Employer: [employer] Name of MCO (if applicable):      Patient’s Job Description/Occupation: Associate       Previous Injuries/Diseases/Surgeries Contributing to the Condition:         Diagnosis: (S61.431A) Puncture wound of right hand without foreign body, initial encounter      Related to the Industrial Injury? Yes     Explain: Was picking up a pallet at work and accidentally sustained a puncture wound from a nail to his right palm.      Objective Medical Findings: Puncture wound present to the right palm just superior to the middle finger.  No active bleeding.  Range of motion present all joints of every finger including the middle finger.  Slightly reduced range of motion with making a full fist of only the middle finger.  Sensation intact and cap refill less than 2 seconds.           None - Discharged                         Stable  No                 Ratable  No        Generally Improved                         Condition Worsened                  Condition Same  May Have Suffered a Permanent Disability No     Treatment Plan:    Patient's presentation and physical exam findings are consistent with a puncture wound to the right palm.  Range of motion present in all joints of the fingers.  Slightly reduced range of motion with making a full fist with the middle finger.  Sensation intact.  Puncture wound was not through and through.  Do not suspect tendon involvement.  Likely some slight reduced range of motion due to swelling and inflammation.  Discussed prescription of ibuprofen as well as prophylactic Augmentin to prevent infection.  Advised to follow-up in 5 days for reevaluation.  Patient is up-to-date on his Tdap.  Wound was thoroughly irrigated and soaked with Hibiclens.         No Change in Therapy                   PT/OT Prescribed                      Medication May be Used While Working        Case Management                          PT/OT Discontinued    Consultation    Further Diagnostic Studies:    Prescription(s)               X  Released to FULL DUTY /No Restrictions on (Date):  4/11/2025    Certified TOTALLY TEMPORARILY DISABLED (Indicate Dates) From:   To:      Released to RESTRICTED/Modified Duty on (Date): From:   To:    Restrictions Are:         No Sitting    No Standing    No Pulling Other: Patient will follow-up on 4/16/2025.       No Bending at Waist     No Stooping     No Lifting        No Carrying     No Walking Lifting Restricted to (lbs.):          No Pushing        No Climbing     No Reaching Above Shoulders       Date of Next Visit:  4/16/2025 Date of this Exam: 4/11/2025 Physician/Chiropractic Physician Name: Shorty South P.A.-C. Physician/Chiropractic Physician Signature:  Hakan Siddiqi DO MPH     [place]:  [address] [place] [address], [address] [place], [place] [address] - Telephone [phone] [place]:  [address] [place] [address], [address] [place], [place] [address] - Telephone [phone]    [email or web address]  D-39 (Rev. 10/24)

## 2025-04-11 NOTE — LETTER
"  EMPLOYEE’S CLAIM FOR COMPENSATION/ REPORT OF INITIAL TREATMENT  FORM C-4  PLEASE TYPE OR PRINT    EMPLOYEE’S CLAIM - PROVIDE ALL INFORMATION REQUESTED   First Name                    CHERELLE Su                  Last Name  Saint Paul Birthdate                    1980                Sex  [x]Male Claim Number (Insurer’s Use Only)     Mailing Address  [address] Age  45 y.o. 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 (if applicable)  Opening box    How did this injury or occupational disease occur? (Be specific and answer in detail. Use additional sheet if necessary)  Hand cut by nail on a box   If you believe that you have an occupational disease, when did you first have knowledge of the disability and its relationship to your employment?  N/A Witnesses to the Accident (if applicable)  N/N      Nature of Injury or Occupational Disease  Puncture  Part(s) of Body Injured or Affected  Hand (R) N/A N/A    I CERTIFY THAT THE ABOVE IS TRUE AND CORRECT TO T HE BEST OF MY KNOWLEDGE AND THAT I HAVE PROVIDED THIS " INFORMATION IN ORDER TO OBTAIN THE BENEFITS OF [place]CC[place], OR CHAPTER 617 OF [place]RS).  I HEREBY AUTHORIZE ANY PHYSICIAN, CHIROPRACTOR, SURGEON, PRACTITIONER OR ANY OTHER PERSON, ANY HOSPITAL, INCLUDING [facility] OR [facility], ANY  MEDICAL SERVICE ORGANIZATION, ANY INSURANCE COMPANY, OR OTHER INSTITUTION OR ORGANIZATION TO RELEASE TO EACH OTHER, ANY MEDICAL OR OTHER INFORMATION, INCLUDING BENEFITS PAID OR PAYABLE, PERTINENT TO THIS INJURY OR DISEASE, EXCEPT INFORMATION RELATIVE TO DIAGNOSIS, TREATMENT AND/OR COUNSELING FOR AIDS, PSYCHOLOGICAL CONDITIONS, ALCOHOL OR CONTROLLED SUBSTANCES, FOR WHICH I MUST GIVE SPECIFIC AUTHORIZATION.  A PHOTOSTAT OF THIS AUTHORIZATION SHALL BE VALID AS THE ORIGINAL.     Date   Place Employee’s Original or  *Electronic Signature   THIS REPORT MUST BE COMPLETED AND MAILED WITHIN 3 WORKING DAYS OF TREATMENT   Place  [facility]    Name of Facility  [facility]   Date 4/11/2025 Diagnosis and Description of Injury or Occupational Disease  (S61.431A) Puncture wound of right hand without foreign body, initial encounter  The encounter diagnosis was Puncture wound of right hand without foreign body, initial encounter. Is there evidence that the injured employee was under the influence of alcohol and/or another controlled substance at the time of accident?  []No  [] Yes (if yes, please explain)   Hour 7:19 PM  No   Treatment: Patient's presentation and physical exam findings are consistent with a puncture wound to the right palm.  Range of motion present in all joints of the fingers.  Slightly reduced range of motion with making a full fist with the middle finger.  Sensation intact.  Puncture wound was not through and through.  Do not suspect tendon involvement.  Likely some slight reduced range of motion due to swelling and inflammation.  Discussed prescription of ibuprofen as well as  prophylactic Augmentin to prevent infection.  Advised to follow-up in 5 days for reevaluation.  Patient is up-to-date on his Tdap.      Have you advised the patient to remain off work five days or more?   No  [] Yes  If yes, indicate dates: From_ _                                                      To __ _  [] No   If no, is the injured employee capable of: [] full duty Yes   [] modified duty      If modified duty, specify any limitations / restrictions:__________________  ___ ___________________________     X-Ray Findings:      From information given by the employee, together with medical evidence, can you directly connect this injury or occupational disease as job incurred?  []Yes   [] No Yes    Is additional medical care by a physician indicated? []Yes [] No  Yes    Do you know of any previous injury or disease contributing to this condition or occupational disease? []Yes [] No (Explain if yes)                          No   Date  4/11/2025 Print Health Care Provider’s Name  Shorty South P.A.-C. I certify that the employer’s copy of  this form was delivered to the employer on:   Address  [address] [place] [address] INSURER'S USE ONLY                       [facility] Zip  [address] Provider’s Tax ID Number  [id number]   Telephone  Dept: [phone]    Health Care Provider’s Original or Electronic Signature      e-SHORTY Enamorado P.A.-C.    Degree (MD,DO, DC,PAMirnaC,APRN)  KIM  Choose (if applicable)      ORIGINAL - TREATING HEALTHCARE PROVIDER PAGE 2 - INSURER/TPA PAGE 3 - EMPLOYER PAGE 4 - EMPLOYEE             Form C-4 (rev.02/25)

## 2025-04-12 NOTE — PROGRESS NOTES
"Subjective:     Navius Saint Paul is a 45 y.o. male who presents for Work-Related Injury (New WC, DOI 04/11/25, R hand injury, nail went through hand )      DOI 4/11/2025: Patient sustained a work-related injury earlier today when he was picking up a pallet and accidentally sustained a puncture wound due to a nail in the palate.  The wound is to his right palm.  States that he did wash the area right away and did have some bleeding with the bleeding has since stopped.  Not having any numbness, tingling, or radiation of pain up the arm or into the fingers.  Does report some reduced range of motion to the right middle finger.    PMH:   No pertinent past medical history to this problem  MEDS:  Medications were reviewed in EMR  ALLERGIES:  Allergies were reviewed in EMR  FH:   No pertinent family history to this problem       Objective:     /76   Pulse 75   Temp 37.1 °C (98.8 °F) (Temporal)   Resp 16   Ht 1.778 m (5' 10\")   Wt 68.9 kg (152 lb)   SpO2 100%   BMI 21.81 kg/m²     Puncture wound present to the right palm just superior to the middle finger.  No active bleeding.  Range of motion present all joints of every finger including the middle finger.  Slightly reduced range of motion with making a full fist of only the middle finger.  Sensation intact and cap refill less than 2 seconds.      Assessment/Plan:       1. Puncture wound of right hand without foreign body, initial encounter  - amoxicillin-clavulanate (AUGMENTIN) 875-125 MG Tab; Take 1 Tablet by mouth 2 times a day for 5 days.  Dispense: 10 Tablet; Refill: 0  - ibuprofen (MOTRIN) 600 MG Tab; Take 1 Tablet by mouth every 6 hours as needed for Moderate Pain.  Dispense: 30 Tablet; Refill: 0      FROM   TO    Patient will follow-up on 4/16/2025.   Patient's presentation and physical exam findings are consistent with a puncture wound to the right palm.  Range of motion present in all joints of the fingers.  Slightly reduced range of motion with " making a full fist with the middle finger.  Sensation intact.  Puncture wound was not through and through.  Do not suspect tendon involvement.  Likely some slight reduced range of motion due to swelling and inflammation.  Discussed prescription of ibuprofen as well as prophylactic Augmentin to prevent infection.  Advised to follow-up in 5 days for reevaluation.  Patient is up-to-date on his Tdap.      Differential diagnosis, natural history, supportive care, and indications for immediate follow-up discussed.

## 2025-04-15 ENCOUNTER — OCCUPATIONAL MEDICINE (OUTPATIENT)
Dept: OCCUPATIONAL MEDICINE | Facility: CLINIC | Age: 45
End: 2025-04-15
Payer: COMMERCIAL

## 2025-04-15 VITALS
TEMPERATURE: 98 F | HEIGHT: 70 IN | WEIGHT: 142 LBS | HEART RATE: 88 BPM | OXYGEN SATURATION: 97 % | DIASTOLIC BLOOD PRESSURE: 72 MMHG | SYSTOLIC BLOOD PRESSURE: 118 MMHG | BODY MASS INDEX: 20.33 KG/M2 | RESPIRATION RATE: 18 BRPM

## 2025-04-15 DIAGNOSIS — S61.431D PUNCTURE WOUND OF RIGHT HAND WITHOUT FOREIGN BODY, SUBSEQUENT ENCOUNTER: ICD-10-CM

## 2025-04-15 PROCEDURE — 99213 OFFICE O/P EST LOW 20 MIN: CPT | Performed by: PREVENTIVE MEDICINE

## 2025-04-15 ASSESSMENT — PAIN SCALES - GENERAL: PAINLEVEL_OUTOF10: 1=MINIMAL PAIN

## 2025-04-15 NOTE — PROGRESS NOTES
Subjective:     Navius Saint Paul is a 45 y.o. male who presents for Follow-Up (DOI 4/11/2025 - Right Hand )    [place]: Nail was sticking out of a pallet and accidentally hit his hand on a causing puncture wound.  He was seen in the urgent care advised basic wound care    4/15/2025: Patient states overall symptoms are much improved.  Has only minimal discomfort.  Has full use of the hand at this point.  Has been working full duty.      ROS: All systems were reviewed on intake form, form was reviewed and signed. See scanned documents in media. Pertinent positives and negatives included in HPI.    PMH: No pertinent past medical history to this problem  MEDS: Medications were reviewed in Epic  ALLERGIES: No Known Allergies  SOCHX: Works as a  at [employer]  FH: No pertinent family history to this problem       Objective:     There were no vitals taken for this visit.    Constitutional: Patient is in no acute distress. Appears well-developed and well-nourished.   Cardiovascular: Normal rate.    Pulmonary/Chest: Effort normal. No respiratory distress.   Neurological: Patient is alert and oriented to person, place, and time.   Skin: Skin is warm and dry.   Psychiatric: Normal mood and affect. Behavior is normal.     Right Hand: Well-healed puncture wound on the palm of the hand.  Full range of motion of all digits.  Minimal tenderness palpation.    Assessment/Plan:     1. Puncture wound of right hand without foreign body, subsequent encounter      Release from care  Full duty  Follow-up as needed    Work Status: Full Duty - see D-39 or other state/federal worker's compensation forms for specific restrictions if applicable      Differential diagnosis, natural history, supportive care, and indications for immediate follow-up discussed.

## 2025-04-15 NOTE — LETTER
PHYSICIAN’S AND CHIROPRACTIC PHYSICIAN'S   PROGRESS REPORT   CERTIFICATION OF DISABILITY Claim Number:     Social Security Number:    Patient’s Name: Navius Saint Paul Date of Injury: 4/11/2025   Employer: [employer] Name of MCO (if applicable):      Patient’s Job Description/Occupation: Associate       Previous Injuries/Diseases/Surgeries Contributing to the Condition:         Diagnosis: (S61.431D) Puncture wound of right hand without foreign body, subsequent encounter      Related to the Industrial Injury? Yes     Explain:        Objective Medical Findings: Right Hand: Well-healed puncture wound on the palm of the hand.  Full range of motion of all digits.  Minimal tenderness palpation.      X   None - Discharged                         Stable  Yes                 Ratable  No     X   Generally Improved                         Condition Worsened                  Condition Same  May Have Suffered a Permanent Disability No     Treatment Plan:    Release from care  Full duty  Follow-up as needed         No Change in Therapy                  PT/OT Prescribed                      Medication May be Used While Working        Case Management                          PT/OT Discontinued    Consultation    Further Diagnostic Studies:    Prescription(s)               X  Released to FULL DUTY /No Restrictions on (Date):  4/15/2025    Certified TOTALLY TEMPORARILY DISABLED (Indicate Dates) From:   To:      Released to RESTRICTED/Modified Duty on (Date): From:   To:    Restrictions Are:         No Sitting    No Standing    No Pulling Other:         No Bending at Waist     No Stooping     No Lifting        No Carrying     No Walking Lifting Restricted to (lbs.):          No Pushing        No Climbing     No Reaching Above Shoulders       Date of Next Visit:   Release from care Date of this Exam: 4/15/2025 Physician/Chiropractic Physician Name: Hakan Siddiqi D.O. Physician/Chiropractic Physician Signature:  Hakan Siddiqi  DO MPH     [place]:  [facility]  [facility], [address] [place], [place] [address] - Telephone [phone] [place]:  [address], [address] [place], [place] [address] - Telephone [phone]    [email or web address]  D-39 (Rev. 10/24)